# Patient Record
Sex: FEMALE | Race: WHITE | NOT HISPANIC OR LATINO | Employment: FULL TIME | URBAN - METROPOLITAN AREA
[De-identification: names, ages, dates, MRNs, and addresses within clinical notes are randomized per-mention and may not be internally consistent; named-entity substitution may affect disease eponyms.]

---

## 2019-09-14 ENCOUNTER — OFFICE VISIT (OUTPATIENT)
Dept: URGENT CARE | Facility: CLINIC | Age: 54
End: 2019-09-14
Payer: COMMERCIAL

## 2019-09-14 ENCOUNTER — HOSPITAL ENCOUNTER (OUTPATIENT)
Facility: MEDICAL CENTER | Age: 54
End: 2019-09-14
Attending: NURSE PRACTITIONER
Payer: COMMERCIAL

## 2019-09-14 VITALS
RESPIRATION RATE: 12 BRPM | TEMPERATURE: 98.3 F | HEART RATE: 97 BPM | BODY MASS INDEX: 25.59 KG/M2 | WEIGHT: 153.6 LBS | DIASTOLIC BLOOD PRESSURE: 70 MMHG | SYSTOLIC BLOOD PRESSURE: 102 MMHG | HEIGHT: 65 IN | OXYGEN SATURATION: 93 %

## 2019-09-14 DIAGNOSIS — R30.0 DYSURIA: ICD-10-CM

## 2019-09-14 DIAGNOSIS — N30.90 CYSTITIS: ICD-10-CM

## 2019-09-14 LAB
APPEARANCE UR: NORMAL
BILIRUB UR STRIP-MCNC: NORMAL MG/DL
COLOR UR AUTO: NORMAL
GLUCOSE UR STRIP.AUTO-MCNC: 250 MG/DL
KETONES UR STRIP.AUTO-MCNC: 15 MG/DL
LEUKOCYTE ESTERASE UR QL STRIP.AUTO: NORMAL
NITRITE UR QL STRIP.AUTO: POSITIVE
PH UR STRIP.AUTO: 5 [PH] (ref 5–8)
PROT UR QL STRIP: >=300 MG/DL
RBC UR QL AUTO: NORMAL
SP GR UR STRIP.AUTO: 1.01
UROBILINOGEN UR STRIP-MCNC: 4 MG/DL

## 2019-09-14 PROCEDURE — 87086 URINE CULTURE/COLONY COUNT: CPT

## 2019-09-14 PROCEDURE — 99203 OFFICE O/P NEW LOW 30 MIN: CPT | Performed by: NURSE PRACTITIONER

## 2019-09-14 PROCEDURE — 87186 SC STD MICRODIL/AGAR DIL: CPT

## 2019-09-14 PROCEDURE — 87077 CULTURE AEROBIC IDENTIFY: CPT

## 2019-09-14 PROCEDURE — 81002 URINALYSIS NONAUTO W/O SCOPE: CPT | Performed by: NURSE PRACTITIONER

## 2019-09-14 RX ORDER — CEFDINIR 300 MG/1
300 CAPSULE ORAL 2 TIMES DAILY
Qty: 10 CAP | Refills: 0 | Status: SHIPPED | OUTPATIENT
Start: 2019-09-14 | End: 2019-09-19

## 2019-09-14 RX ORDER — THYROID 90 MG/1
90 TABLET ORAL DAILY
COMMUNITY

## 2019-09-14 ASSESSMENT — ENCOUNTER SYMPTOMS
ABDOMINAL PAIN: 0
FLANK PAIN: 0
NAUSEA: 0
FEVER: 0
CHILLS: 0
VOMITING: 0

## 2019-09-15 DIAGNOSIS — R30.0 DYSURIA: ICD-10-CM

## 2019-09-15 NOTE — PROGRESS NOTES
Subjective:      Elaina Ramos is a 53 y.o. female who presents with UTI (x 1 day.  Pt. states that this am she started having pain with urination, urgency and frequency.  She denies any fever or chills. )    History reviewed. No pertinent past medical history.  Social History     Socioeconomic History   • Marital status:      Spouse name: Not on file   • Number of children: Not on file   • Years of education: Not on file   • Highest education level: Not on file   Occupational History   • Not on file   Social Needs   • Financial resource strain: Not on file   • Food insecurity:     Worry: Not on file     Inability: Not on file   • Transportation needs:     Medical: Not on file     Non-medical: Not on file   Tobacco Use   • Smoking status: Never Smoker   • Smokeless tobacco: Never Used   Substance and Sexual Activity   • Alcohol use: Yes     Comment: Socially   • Drug use: Not on file   • Sexual activity: Yes     Partners: Male   Lifestyle   • Physical activity:     Days per week: Not on file     Minutes per session: Not on file   • Stress: Not on file   Relationships   • Social connections:     Talks on phone: Not on file     Gets together: Not on file     Attends Nondenominational service: Not on file     Active member of club or organization: Not on file     Attends meetings of clubs or organizations: Not on file     Relationship status: Not on file   • Intimate partner violence:     Fear of current or ex partner: Not on file     Emotionally abused: Not on file     Physically abused: Not on file     Forced sexual activity: Not on file   Other Topics Concern   • Not on file   Social History Narrative   • Not on file     History reviewed. No pertinent family history.    Allergies: Patient has no allergy information on record.    Patient is a 53-year-old female who presents today with complaint of dysuria, urgency, and frequency starting this morning.  Denies any fevers or lower back pain.  No nausea or vomiting.   Is traveling to [place] from out of town, they are here for the air races.        Dysuria    This is a new problem. The current episode started in the past 7 days. The problem occurs every urination. The problem has been unchanged. The quality of the pain is described as aching and burning. The pain is mild. There has been no fever. Associated symptoms include frequency and urgency. Pertinent negatives include no chills, flank pain, hematuria, nausea or vomiting. She has tried nothing for the symptoms. The treatment provided no relief.       Review of Systems   Constitutional: Positive for malaise/fatigue. Negative for chills and fever.   Gastrointestinal: Negative for abdominal pain, nausea and vomiting.   Genitourinary: Positive for dysuria, frequency and urgency. Negative for flank pain and hematuria.   All other systems reviewed and are negative.         Objective:     There were no vitals taken for this visit.     Physical Exam   Constitutional: She is oriented to person, place, and time. She appears well-developed and well-nourished.   Cardiovascular: Normal rate and regular rhythm.   Pulmonary/Chest: Effort normal and breath sounds normal.   Abdominal: Soft. She exhibits no distension. There is tenderness. There is no rebound and no guarding.   Positive suprapubic tenderness  No CVAT   Musculoskeletal: Normal range of motion.   Neurological: She is alert and oriented to person, place, and time.   Skin: Skin is warm and dry.   Psychiatric: She has a normal mood and affect. Her behavior is normal. Judgment and thought content normal.   Vitals reviewed.    UA: positive blood, positive leukocytes, positive nitrates          Assessment/Plan:     1. Dysuria     - POCT Urinalysis  - URINE CULTURE(NEW); Future  -omnicef  -may continue AZO, already has  -push fluids  -recommended avoiding ETOH and caffeine  -ER precautions: fever >101, n/v, flu like symptoms

## 2019-09-17 LAB
BACTERIA UR CULT: ABNORMAL
BACTERIA UR CULT: ABNORMAL
SIGNIFICANT IND 70042: ABNORMAL
SITE SITE: ABNORMAL
SOURCE SOURCE: ABNORMAL